# Patient Record
Sex: MALE | NOT HISPANIC OR LATINO | URBAN - METROPOLITAN AREA
[De-identification: names, ages, dates, MRNs, and addresses within clinical notes are randomized per-mention and may not be internally consistent; named-entity substitution may affect disease eponyms.]

---

## 2017-09-05 ENCOUNTER — IMPORTED ENCOUNTER (OUTPATIENT)
Dept: URBAN - METROPOLITAN AREA CLINIC 1 | Facility: CLINIC | Age: 67
End: 2017-09-05

## 2017-09-05 PROBLEM — H04.123: Noted: 2017-09-05

## 2017-09-05 PROBLEM — E11.9: Noted: 2017-09-05

## 2017-09-05 PROBLEM — Z79.84: Noted: 2017-09-05

## 2017-09-05 PROBLEM — H01.001: Noted: 2017-09-05

## 2017-09-05 PROBLEM — H01.004: Noted: 2017-09-05

## 2017-09-05 PROBLEM — H25.813: Noted: 2017-09-05

## 2017-09-05 PROCEDURE — 92014 COMPRE OPH EXAM EST PT 1/>: CPT

## 2017-09-05 NOTE — PATIENT DISCUSSION
1.  DM Type II without sign of diabetic retinopathy and no blot heme on dilated retinal examination today OU No Macular Edema:  Discussed the pathophysiology of diabetes and its effect on the eye and risk of blindness. Stressed the importance of strong glucose control. Advised of importance of at least yearly dilated examinations but to contact us immediately for any problems or concerns. 2. Type II diabetes controlled by oral medications. 3.  Cataract OU: Observe for now without intervention. The patient was advised to contact us if any change or worsening of vision4. Dry Eyes OU -- Recommended to patient to use Artificial Tears BID OU5. Blepharitis anterior type OU - Daily warm compresses and lid scrubs were recommended. 6. Patient defers the refraction at today's visit    7. Return for an appointment in 1 year for 30. with Dr. Jassi Small.

## 2017-10-06 NOTE — PATIENT DISCUSSION
(W56.444) Vitreous degeneration, bilateral - Assesment : Examination revealed PVD OU. - Plan : Monitor for changes. Advised patient to call our office with decreased vision or an increase in flashes and/or floaters.

## 2017-10-06 NOTE — PATIENT DISCUSSION
(E11.9) Type 2 diabetes mellitus without complications - Assesment : Examination reveals Type 2 Diabetes mellitus without ocular complications. - Plan : Continue following with PCP for routine care. Stressed importance of keeping A1c levels below 7.0 and monitoring blood sugar. Letter explaining today's findings faxed to patient's PCP. RV 6 Months for Follow Up and Macular OCT, or sooner if having problems or changes in vision.

## 2017-10-06 NOTE — PATIENT DISCUSSION
(W39.2193) Nonexudative age-related macular degeneration bilateral tessa - Assesment : Examination revealed AMD Dry OU. OCTM performed shows minimal change since last year. - Plan : Monitor for changes. Advised patient to call our office with decreased vision or increased distortion. Patient advised to check Amsler Grid regularly (once weekly or more) and use nutraceuticals such as AREDS 2 eye vitamins. Wear sunglasses when outdoors and eat green, leafy vegetables to maintain ocular health.

## 2017-10-06 NOTE — PATIENT DISCUSSION
(H25.013) Cortical age-related cataract, bilateral - Assesment : Examination revealed Cortical Senile Cataract. OD>OS. Hx of steroid injections. Moderate symptoms. - Plan : Monitor for changes. Advised patient to call our office with decreased vision or increased symptoms. Explanation of condition given to patient and that diabetic changes and steroid injections can promote cataract development. OK to proceed with cataract surgery when patient desires, but fine to wait at this time.

## 2018-09-05 ENCOUNTER — IMPORTED ENCOUNTER (OUTPATIENT)
Dept: URBAN - METROPOLITAN AREA CLINIC 1 | Facility: CLINIC | Age: 68
End: 2018-09-05

## 2018-09-05 PROBLEM — H25.813: Noted: 2018-09-05

## 2018-09-05 PROBLEM — H16.143: Noted: 2018-09-05

## 2018-09-05 PROBLEM — Z79.84: Noted: 2018-09-05

## 2018-09-05 PROBLEM — H01.002: Noted: 2018-09-05

## 2018-09-05 PROBLEM — H04.123: Noted: 2018-09-05

## 2018-09-05 PROBLEM — E11.9: Noted: 2018-09-05

## 2018-09-05 PROBLEM — H01.005: Noted: 2018-09-05

## 2018-09-05 PROCEDURE — 92014 COMPRE OPH EXAM EST PT 1/>: CPT

## 2018-09-05 NOTE — PATIENT DISCUSSION
1.  DM Type II without sign of diabetic retinopathy and no blot heme on dilated retinal examination today OU No Macular Edema: Controlled. Discussed the pathophysiology of diabetes and its effect on the eye and risk of blindness. Stressed the importance of strong glucose control. Advised of importance of at least yearly dilated examinations but to contact us immediately for any problems or concerns. 2. Type II diabetes controlled by oral medications. 3.  Cataract OU: Observe for now without intervention. The patient was advised to contact us if any change or worsening of vision4. ORIN w/ PEK OU- stable. The continuation of artificial tears were recommended. 5.  Blepharitis anterior type OU- Controlled. Continue daily warm compresses and lid scrubs recommended. 6. Patient defers the refraction at today's visit. Pt happy wearing otc readers only. 7.  Return for an appointment for 30 in 1 year with Dr. Kala Grace.

## 2018-10-04 NOTE — PATIENT DISCUSSION
(E11.9) Type 2 diabetes mellitus without complications - Assesment : Examination reveals Type 2 Diabetes mellitus without ocular complications. - Plan : Continue following with PCP for routine care. Stressed importance of keeping A1c levels below 7.0 and monitoring blood sugar. Letter explaining today's findings faxed to patient's PCP.

## 2018-10-04 NOTE — PATIENT DISCUSSION
(V01.035) Vitreous degeneration, bilateral - Assesment : Examination revealed PVD OU. - Plan : Monitor for changes. Advised patient to call our office with decreased vision or an increase in flashes and/or floaters.

## 2018-10-04 NOTE — PATIENT DISCUSSION
(W85.5469) Nonexudative age-related macular degeneration bilateral tessa - Assesment : Examination revealed AMD Dry OU. MAC OCT shows scattered drusen OU. - Plan : Monitor for changes. Advised patient to call our office with decreased vision or increased distortion. RV 6-8 months Exam/MAC OCT.

## 2019-01-30 NOTE — PATIENT DISCUSSION
(J82.0059) Nonexudative age-related macular degeneration bilateral tessa - Assesment : Examination revealed AMD Dry OU. DRY ARMD MILD UNCHANGED - Plan : Monitor for changes. Advised patient to call our office with decreased vision or increased distortion.

## 2019-01-30 NOTE — PATIENT DISCUSSION
(E11.9) Type 2 diabetes mellitus without complications - Assesment : Examination reveals Type 2 Diabetes mellitus without ocular complications. IF BLOOD SUGARS ARE OUT OF CONTROL TI COULD BLUR PATIENTS VISION BECAUSE IT CAUSES THE CORNEA TO SWELL - Plan : Continue following with PCP for routine care. Stressed importance of keeping A1c levels below 7.0 and monitoring blood sugar. Letter explaining today's findings faxed to patient's PCP.

## 2019-01-30 NOTE — PATIENT DISCUSSION
(H25.013) Cortical age-related cataract, bilateral - Assesment : Examination revealed Cortical Senile Cataract. OD>OS. Hx of steroid injections. PATIENT WILL NOTICE BETTER QUALITY AND CLARITY OF VISION IF THE CATARACTS WERE REMOVED. PATIENT DOES ALSO HAVE DRY ARMD OU. THE CATARACTS ARE SIGNIFICANT. ANATOMY OF CATARACTS DISCUSSED WITH PATIENT. THE DRY ARMD COULD CONTRIBUTE TO BLURRY VISION. PATIENT WILL NOT BE 20/20 BECAUSE OF THE UNDERLYING DRY ARMD. RECOMMEND PATIENT REMOVE THE CATARACTS FOR BETTER VISION.   A NEW RX WILL NOT IMPROVE THE VISION LONG TERM - Plan : PATIENT TO TALK TO SURGERY COUNSELOR TODAY

## 2019-02-18 NOTE — PATIENT DISCUSSION
(C00.7481) Nonexudative age-related macular degeneration bilateral tessa - Assesment : Examination revealed AMD Dry OU. DRY ARMD MILD UNCHANGED - Plan : Monitor for changes. Advised patient to call our office with decreased vision or increased distortion. Patient advised to check Amsler Grid regularly (once weekly or more) and use nutraceuticals such as AREDS 2 eye vitamins. Wear sunglasses when outdoors and eat green, leafy vegetables to maintain ocular health.

## 2019-02-18 NOTE — PATIENT DISCUSSION
(H25.13) Age-related nuclear cataract, bilateral - Assesment : Examination revealed cataract. BOTH CATARACTS AND ARMD OU ARE CONTRIBUTING TO BLURRED VISION. PATIENT WOULD LIKE TO FOCUS OU AT [place]. PATIENT WANTS TO IMPROVE THE QUALITY OF HER DISTANCE VISION WHILE DRIVING. DO NOT RECOMMEND MFIOLS SECONDARY TO ARMD.  ADVISED PATIENT SHE'S MILDLY NEARSIGHTED WHICH IS WHY PT IS ABLE TO READ WITHOUT GLASSES AT TIME. PATIENT DOESN'T MIND WEARING READING GLASSES. WISHES TO FOCUS OU AT DISTANCE AND WEAR GLASSES FOR READING. - Plan : Recommend package with advanced technology for the management and treatment of astigmatism and/or to aid in the management of presbyopia with blended vision, mono vision, or multifocal IOL. The astigmatism management may include Toric IOL placed intraoperatively, or LRI intraoperatively or as included postoperative treatment, or PRK treatment postoperatively. Risks, Benefits and Alternatives were discussed with patient at length for Cataract Surgery. Visual symptoms are consistent with Cataract findings on examination and current refraction no longer provides satisfactory vision. Patient understands and desires surgery. All questions answered. Risks, Benefits and Alternatives discussed at length for IOL placement. Patient will need to wear glasses for READING. EYE: OD  IOL TYPE: MONOFOCAL/ LRI  POST OPERATIVE TARGET: DISTANCE PLANO TO -0.50 DR RECOMMENDED PACKAGE:  TP / LRI  PT PREFERRED PACKAGE:  TP OS TO POSSIBLY FOLLOW Patient to see surgery counselor today.

## 2019-03-06 NOTE — PATIENT DISCUSSION
(Z96.1) Presence of intraocular lens - Assesment : Patient is Pseudophakic. ORA was done in OR on operated eye. NORMAL POST OP APPEARANCE AND ADVISED PATIENT TO CALL IF ANY CHANGES IN VISION OR PAIN. PATIENT CAN TAKE TYLENOL FOR THE HEADACHES - Plan : Discussed signs and symptoms of infection and retinal detachments. Do not rub operated eye.  Follow drop schedule If redness,pain,decreased vision, flashes or floaters occur then contact clinic.  1 WEEK

## 2019-03-12 NOTE — PATIENT DISCUSSION
(H25.13) Age-related nuclear cataract, bilateral - Assesment : Examination revealed cataract. BOTH CATARACTS AND ARMD OU ARE CONTRIBUTING TO BLURRED VISION. PATIENT WOULD LIKE TO FOCUS OU AT [email or web address]. PATIENT WANTS TO IMPROVE THE QUALITY OF HER DISTANCE VISION WHILE DRIVING. DO NOT RECOMMEND MFIOLS SECONDARY TO ARMD.  ADVISED PATIENT SHE'S MILDLY NEARSIGHTED WHICH IS WHY PT IS ABLE TO READ WITHOUT GLASSES AT TIME. PATIENT DOESN'T MIND WEARING READING GLASSES. WISHES TO FOCUS OU AT DISTANCE AND WEAR GLASSES FOR READING. OS IS SLIGHTLY NEARSIGHTED. SOME ASTIGMATISM PRESENT OS. - Plan : Recommend package with advanced technology for the management and treatment of astigmatism and/or to aid in the management of presbyopia with blended vision, mono vision, or multifocal IOL. The astigmatism management may include Toric IOL placed intraoperatively, or LRI intraoperatively or as included postoperative treatment, or PRK treatment postoperatively. Risks, Benefits and Alternatives were discussed with patient at length for Cataract Surgery. Visual symptoms are consistent with Cataract findings on examination and current refraction no longer provides satisfactory vision. Patient understands and desires surgery. All questions answered. Risks, Benefits and Alternatives discussed at length for IOL placement. Patient will need to wear glasses for READING. EYE: OS IOL TYPE: MONOFOCAL/ LRI  POST OPERATIVE TARGET: DISTANCE PLANO TO -0.50 DR RECOMMENDED PACKAGE:  TP / LRI  PT PREFERRED PACKAGE:  TP  Patient to see surgery counselor today.

## 2019-03-12 NOTE — PATIENT DISCUSSION
(C80.2524) Nonexudative age-related macular degeneration bilateral tessa - Assesment : Examination revealed AMD Dry OU - Plan : Monitor for changes. Advised patient to call our office with decreased vision or increased distortion. Patient advised to check Ams[employer] Grid regularly (once weekly or more) and use nutraceuticals such as AREDS 2 eye vitamins. Wear sunglasses when outdoors and eat green, leafy vegetables to maintain ocular health.

## 2019-03-12 NOTE — PATIENT DISCUSSION
(Z96.1) Presence of intraocular lens - Assesment : Patient is Pseudophakic. EYE IS RECOVERING WELL AND PATIENT WISHES TO PROCEED WITH SURGERY FOR THE LEFT EYE. RECOMMEND ARTIFICIAL TEARS WHEN EYE FEELS IRRITATED. - Plan : Signs and symptoms of infection and retinal detachment are outlined in your surgical packet. Do not rub operated eye. Follow drop schedule. If redness, pain, decreased vision, flashes or floaters occur then contact clinic.

## 2019-03-27 NOTE — PATIENT DISCUSSION
(Z96.1) Presence of intraocular lens - Assesment : Patient is Pseudophakic. ORA done in OR during surgery S/P KENALOG INJECTION-CONJUNCTIVAL AT THE TIME OF CATARACT SURGERY - Plan : Discussed signs and symptoms of infection and retinal detachments. Do not rub operated eye. Follow drop schedule If redness,pain,decreased vision, flashes or floaters occur then contact clinic.   USE INVELTYS QD OS START PROLENSA QD OU  1 WEEK

## 2019-04-02 NOTE — PATIENT DISCUSSION
(Z96.1) Presence of intraocular lens - Assesment : Patient is Pseudophakic. ORA done in OR during surgery S/P KENALOG INJECTION-CONJUNCTIVAL AT THE TIME OF CATARACT SURGERY  NORMAL POST OP APPEARANCE. LENS IS IN GOOD POSITION. - Plan : Discussed signs and symptoms of infection and retinal detachments. Do not rub operated eye. Follow drop schedule If redness,pain,decreased vision, flashes or floaters occur then contact clinic.  CPM: INVELTYS QD OS CONTINUE PROLENSA QD OU USE UNTIL ITS GONE  3 WEEKS REFRACT/ MAC OCT

## 2019-05-02 NOTE — PATIENT DISCUSSION
(Z96.1) Presence of intraocular lens - Assesment : Patient is Pseudophakic. NO INFLAMMATION SEEN OU TODAY  PAIN IN OS IS LIKELY REFERRED PAIN ? NECK. - Plan : Signs and symptoms of infection and retinal detachment are outlined in your surgical packet. Do not rub operated eye. Follow drop schedule. If redness, pain, decreased vision, flashes or floaters occur then contact clinic.  RESTART INVELTYS TID X 5 DAYS, THEN DECREASE TO BID X 5 DAYS, THEN DECREASE TO QD X 5 DAYS THEN STOP  RESTART PROLENSA OD QD X 3 WEEKS THEN STOP  GTT INSTRUCTIONS TO PT TODAY  1/2020 EXAM / MAC PHOTOS

## 2019-05-02 NOTE — PATIENT DISCUSSION
(E79.7541) Nonexudative age-related macular degeneration bilateral tessa - Assesment : Examination revealed AMD Dry OU- MILD - Plan : Monitor for changes. Advised patient to call our office with decreased vision or increased distortion. Patient advised to check Amsler Grid regularly (once weekly or more) and use nutraceuticals such as AREDS 2 eye vitamins. Wear sunglasses when outdoors and eat green, leafy vegetables to maintain ocular health.

## 2019-09-10 ENCOUNTER — IMPORTED ENCOUNTER (OUTPATIENT)
Dept: URBAN - METROPOLITAN AREA CLINIC 1 | Facility: CLINIC | Age: 69
End: 2019-09-10

## 2019-09-10 PROBLEM — H01.001: Noted: 2019-09-10

## 2019-09-10 PROBLEM — E11.9: Noted: 2019-09-10

## 2019-09-10 PROBLEM — H01.005: Noted: 2019-09-10

## 2019-09-10 PROBLEM — H25.813: Noted: 2019-09-10

## 2019-09-10 PROBLEM — H01.004: Noted: 2019-09-10

## 2019-09-10 PROBLEM — H01.002: Noted: 2019-09-10

## 2019-09-10 PROBLEM — Z79.84: Noted: 2019-09-10

## 2019-09-10 PROBLEM — H04.123: Noted: 2019-09-10

## 2019-09-10 PROBLEM — H16.143: Noted: 2019-09-10

## 2019-09-10 PROCEDURE — 92014 COMPRE OPH EXAM EST PT 1/>: CPT

## 2019-09-10 NOTE — PATIENT DISCUSSION
1.  DM Type II (Oral Meds) -- Without sign of diabetic retinopathy and no blot heme on dilated retinal examination today OU and no Macular Edema OU: Discussed the pathophysiology of diabetes and its effect on the eye and risk of blindness. Stressed the importance of strong glucose control. Advised of importance of at least yearly dilated examinations but to contact us immediately for any problems or concerns. 2. Cataracts OU -- Observe for now without intervention. The patient was advised to contact us if any change or worsening of vision. 3. ORIN w/ PEK OU -- Stable. Recommend continue the frequent use of OTC AT's BID-QID OU Routinely. 4. Blepharitis OU -- Recommend Hot Moist Compresses and Lid Scrubs / Baby Shampoo QHS OU PRN. Letter to Dr. Nai Burnett. Patient defers the refraction at today's visit. Return for an appointment in 1 YR for a Tiera / Israel Pena / MRx OU with Dr. Tiffanie Alvarado.

## 2020-02-13 NOTE — PATIENT DISCUSSION
Retinal tear and detachment warning symptoms reviewed and patient instructed to call immediately if increasing floaters, flashes, or decreasing peripheral vision. None

## 2020-03-11 NOTE — PATIENT DISCUSSION
Patient reported to the clinic with an irritated left eye from pouring alcohol in eye today. Patient shared that she flushed her eye shortly after getting alcohol in eye.

## 2020-03-12 NOTE — PATIENT DISCUSSION
Applied Amirah LOZANO. EDNA, BC 8.8Lot# [id number]. Advised not to rub eye. If it falls out call office to replace.

## 2020-09-10 ENCOUNTER — IMPORTED ENCOUNTER (OUTPATIENT)
Dept: URBAN - METROPOLITAN AREA CLINIC 1 | Facility: CLINIC | Age: 70
End: 2020-09-10

## 2020-09-10 PROBLEM — H16.143: Noted: 2020-09-10

## 2020-09-10 PROBLEM — H04.123: Noted: 2020-09-10

## 2020-09-10 PROBLEM — Z79.84: Noted: 2020-09-10

## 2020-09-10 PROBLEM — H40.013: Noted: 2020-09-10

## 2020-09-10 PROBLEM — E11.9: Noted: 2020-09-10

## 2020-09-10 PROBLEM — H25.813: Noted: 2020-09-10

## 2020-09-10 PROCEDURE — 92014 COMPRE OPH EXAM EST PT 1/>: CPT

## 2020-09-10 NOTE — PATIENT DISCUSSION
1.  DM Type II (oral meds) without sign of diabetic retinopathy and no blot heme on dilated retinal examination today OU No Macular Edema:  Discussed the pathophysiology of diabetes and its effect on the eye and risk of blindness. Stressed the importance of strong glucose control. Advised of importance of at least yearly dilated examinations but to contact us immediately for any problems or concerns. 2. Cataract OU- Observe for now without intervention. The patient was advised to contact us if any change or worsening of vision3. ORIN w/ PEK OU- Recommend AT's BID OU 4. Blepharitis OU - Recommend Hot Moist Compresses and Lid Scrubs / Baby Shampoo QHS OU PRN. 5. COAG suspect- (CD 0.55 OD 0.6 OS) IOP today 13 OU -FH. Patient is considered Low Risk. Condition was discussed with patient and patient understands. Will continue to monitor patient for any progression in condition. Patient was advised to call us with any problems questions or concerns. Patient deferred Manifest Rx today. Return for an appointment in 1 year 30/OCT  with Dr. Melissa Núñez.

## 2021-09-10 ENCOUNTER — IMPORTED ENCOUNTER (OUTPATIENT)
Dept: URBAN - METROPOLITAN AREA CLINIC 1 | Facility: CLINIC | Age: 71
End: 2021-09-10

## 2021-09-10 PROBLEM — H25.813: Noted: 2021-09-10

## 2021-09-10 PROBLEM — H40.013: Noted: 2021-09-10

## 2021-09-10 PROBLEM — E11.9: Noted: 2021-09-10

## 2021-09-10 PROBLEM — Z96.1: Noted: 2023-11-08

## 2021-09-10 PROBLEM — Z79.84: Noted: 2021-09-10

## 2021-09-10 PROCEDURE — 92015 DETERMINE REFRACTIVE STATE: CPT

## 2021-09-10 PROCEDURE — 92014 COMPRE OPH EXAM EST PT 1/>: CPT

## 2021-09-10 PROCEDURE — 92133 CPTRZD OPH DX IMG PST SGM ON: CPT

## 2021-09-10 NOTE — PATIENT DISCUSSION
1.  DM Type II (oral meds) without sign of diabetic retinopathy and no blot heme on dilated retinal examination today OU No Macular Edema:  Discussed the pathophysiology of diabetes and its effect on the eye and risk of blindness. Stressed the importance of strong glucose control. Advised of importance of at least yearly dilated examinations but to contact us immediately for any problems or concerns. 2. Glaucoma suspect -- (CD 0.55 OD 0.6 OS) -- OCT today shows . IOP today 14 OU Negative FHx. Patient is considered Low Risk. Condition was discussed with patient and patient understands. Will continue to monitor patient for any progression in condition. Patient was advised to call us with any problems questions or concerns. 3.  Cataract OU -- Observe for now without intervention. The patient was advised to contact us if any change or worsening of vision4. ORIN w/ PEK OU -- Recommend AT's BID OU 5. Blepharitis OU -- Recommend Hot Moist Compresses and Lid Scrubs / Baby Shampoo QHS OU PRN.

## 2021-09-10 NOTE — PATIENT DISCUSSION
1.  DM Type II (oral meds) without sign of diabetic retinopathy and no blot heme on dilated retinal examination today OU No Macular Edema:  Discussed the pathophysiology of diabetes and its effect on the eye and risk of blindness. Stressed the importance of strong glucose control. Advised of importance of at least yearly dilated examinations but to contact us immediately for any problems or concerns. 2. Glaucoma suspect -- (CD 0.55 OD 0.6 OS) -- OCT today shows no progression. IOP today 14 OU Negative FHx. Patient is considered Low Risk. Condition was discussed with patient and patient understands. Will continue to monitor patient for any progression in condition. Patient was advised to call us with any problems questions or concerns. 3.  Cataract OU -- Observe for now without intervention. The patient was advised to contact us if any change or worsening of vision4. ORIN w/ PEK OU -- Recommend AT's BID OU 5. Blepharitis OU -- Recommend Hot Moist Compresses and Lid Scrubs / Baby Shampoo QHS OU PRN. MRX for glasses given. Return for an appointment in 1 year 30/OCT/glare with Dr. Daisy Cifuentes.

## 2022-02-14 NOTE — PATIENT DISCUSSION
Recommended artificial tears to use as directed. May consider treating dry eye further if symptoms persists.

## 2022-04-02 ASSESSMENT — KERATOMETRY
OS_AXISANGLE_DEGREES: 171
OS_AXISANGLE2_DEGREES: 081
OD_AXISANGLE2_DEGREES: 093
OS_K1POWER_DIOPTERS: 43.00
OD_AXISANGLE_DEGREES: 003
OS_K2POWER_DIOPTERS: 44.50
OD_K2POWER_DIOPTERS: 44.25
OD_K1POWER_DIOPTERS: 42.50

## 2022-04-02 ASSESSMENT — TONOMETRY
OS_IOP_MMHG: 14
OD_IOP_MMHG: 13
OD_IOP_MMHG: 14
OS_IOP_MMHG: 14
OS_IOP_MMHG: 13
OD_IOP_MMHG: 14
OS_IOP_MMHG: 13
OS_IOP_MMHG: 12

## 2022-04-02 ASSESSMENT — VISUAL ACUITY
OD_CC: 20/25
OD_GLARE: 20/60
OD_GLARE: 20/50
OS_GLARE: 20/60
OD_GLARE: 20/50
OD_CC: 20/20
OD_CC: 20/20
OS_CC: 20/25
OD_CC: 20/20-2
OS_CC: 20/30
OS_CC: 20/30
OS_CC: 20/25+2
OD_GLARE: 20/50
OS_CC: 20/20-2
OS_GLARE: 20/60
OD_CC: 20/20
OS_GLARE: 20/50
OS_GLARE: 20/50

## 2022-09-13 ENCOUNTER — COMPREHENSIVE EXAM (OUTPATIENT)
Dept: URBAN - METROPOLITAN AREA CLINIC 1 | Facility: CLINIC | Age: 72
End: 2022-09-13

## 2022-09-13 DIAGNOSIS — H25.813: ICD-10-CM

## 2022-09-13 DIAGNOSIS — H16.143: ICD-10-CM

## 2022-09-13 DIAGNOSIS — H40.013: ICD-10-CM

## 2022-09-13 DIAGNOSIS — H43.813: ICD-10-CM

## 2022-09-13 DIAGNOSIS — H04.123: ICD-10-CM

## 2022-09-13 DIAGNOSIS — E11.9: ICD-10-CM

## 2022-09-13 PROCEDURE — 92133 CPTRZD OPH DX IMG PST SGM ON: CPT

## 2022-09-13 PROCEDURE — 92015 DETERMINE REFRACTIVE STATE: CPT

## 2022-09-13 PROCEDURE — 92014 COMPRE OPH EXAM EST PT 1/>: CPT

## 2022-09-13 ASSESSMENT — VISUAL ACUITY
OS_SC: 20/25
OS_BAT: 20/200
OS_SC: J1
OD_SC: J1
OD_BAT: 20/100
OD_SC: 20/25

## 2022-09-13 ASSESSMENT — TONOMETRY
OD_IOP_MMHG: 12
OS_IOP_MMHG: 12

## 2022-09-13 NOTE — PATIENT DISCUSSION
(CD 0.55 OD 0.6 OS) -- OCT today shows no progression and WNL. IOP today 14 OU Negative FHx. Patient is considered Low Risk. Condition was discussed with patient and patient understands. Will continue to monitor patient for any progression in condition. Patient was advised to call us with any problems questions or concerns.

## 2022-09-13 NOTE — PATIENT DISCUSSION
(oral meds) without sign of diabetic retinopathy and no blot heme on dilated retinal examination today OU No Macular Edema: Discussed the pathophysiology of diabetes and its effect on the eye and risk of blindness. Stressed the importance of strong glucose control. Advised of importance of at least yearly dilated examinations but to contact us immediately for any problems or concerns.

## 2022-09-13 NOTE — PATIENT DISCUSSION
The patient defers cataract surgery at this time despite visually significant secondary to glare. Patient may call and schedule if need be.

## 2023-09-27 ENCOUNTER — COMPREHENSIVE EXAM (OUTPATIENT)
Dept: URBAN - METROPOLITAN AREA CLINIC 1 | Facility: CLINIC | Age: 73
End: 2023-09-27

## 2023-09-27 DIAGNOSIS — H25.813: ICD-10-CM

## 2023-09-27 DIAGNOSIS — H04.123: ICD-10-CM

## 2023-09-27 DIAGNOSIS — H43.813: ICD-10-CM

## 2023-09-27 DIAGNOSIS — E11.9: ICD-10-CM

## 2023-09-27 DIAGNOSIS — H16.143: ICD-10-CM

## 2023-09-27 DIAGNOSIS — H40.013: ICD-10-CM

## 2023-09-27 PROCEDURE — 92083 EXTENDED VISUAL FIELD XM: CPT

## 2023-09-27 PROCEDURE — 92014 COMPRE OPH EXAM EST PT 1/>: CPT

## 2023-09-27 PROCEDURE — 92133 CPTRZD OPH DX IMG PST SGM ON: CPT

## 2023-09-27 PROCEDURE — 92015 DETERMINE REFRACTIVE STATE: CPT

## 2023-09-27 ASSESSMENT — VISUAL ACUITY
OS_SC: 20/30+1
OD_SC: 20/25-2
OD_BAT: 20/150
OS_BAT: 20/200
OD_SC: J2
OS_SC: J2

## 2023-09-27 ASSESSMENT — TONOMETRY
OD_IOP_MMHG: 16
OS_IOP_MMHG: 14

## 2023-10-24 ENCOUNTER — COMPREHENSIVE EXAM (OUTPATIENT)
Dept: URBAN - METROPOLITAN AREA CLINIC 1 | Facility: CLINIC | Age: 73
End: 2023-10-24

## 2023-10-24 VITALS
DIASTOLIC BLOOD PRESSURE: 50 MMHG | WEIGHT: 300 LBS | SYSTOLIC BLOOD PRESSURE: 94 MMHG | HEART RATE: 76 BPM | BODY MASS INDEX: 35.42 KG/M2 | HEIGHT: 77 IN

## 2023-10-24 DIAGNOSIS — H25.813: ICD-10-CM

## 2023-10-24 PROCEDURE — 92136 OPHTHALMIC BIOMETRY: CPT

## 2023-10-24 PROCEDURE — 92012 INTRM OPH EXAM EST PATIENT: CPT

## 2023-10-24 PROCEDURE — 92025 CPTRIZED CORNEAL TOPOGRAPHY: CPT | Mod: NC

## 2023-10-24 ASSESSMENT — TONOMETRY
OS_IOP_MMHG: 14
OD_IOP_MMHG: 15

## 2023-10-24 ASSESSMENT — VISUAL ACUITY
OD_BAT: 20/150
OS_SC: 20/30+1
OD_SC: 20/25-2
OS_BAT: 20/200

## 2023-11-08 ENCOUNTER — SURGERY/PROCEDURE (OUTPATIENT)
Dept: URBAN - METROPOLITAN AREA SURGERY 1 | Facility: SURGERY | Age: 73
End: 2023-11-08

## 2023-11-08 DIAGNOSIS — H25.812: ICD-10-CM

## 2023-11-08 PROBLEM — Z96.1: Noted: 2023-11-08

## 2023-11-08 PROBLEM — E11.9: Noted: 2021-09-10

## 2023-11-08 PROBLEM — H40.013: Noted: 2021-09-10

## 2023-11-08 PROCEDURE — 66984 XCAPSL CTRC RMVL W/O ECP: CPT

## 2023-11-08 PROCEDURE — 68841 INSJ RX ELUT IMPLT LAC CANAL: CPT

## 2023-11-09 ENCOUNTER — POST-OP (OUTPATIENT)
Dept: URBAN - METROPOLITAN AREA CLINIC 2 | Facility: CLINIC | Age: 73
End: 2023-11-09

## 2023-11-09 DIAGNOSIS — Z96.1: ICD-10-CM

## 2023-11-09 PROCEDURE — 99024 POSTOP FOLLOW-UP VISIT: CPT

## 2023-11-09 ASSESSMENT — VISUAL ACUITY: OS_SC: 20/20-1

## 2023-11-09 ASSESSMENT — TONOMETRY: OS_IOP_MMHG: 14

## 2023-11-14 ENCOUNTER — POST OP/EVAL OF SECOND EYE (OUTPATIENT)
Dept: URBAN - METROPOLITAN AREA CLINIC 1 | Facility: CLINIC | Age: 73
End: 2023-11-14

## 2023-11-14 VITALS
WEIGHT: 300 LBS | DIASTOLIC BLOOD PRESSURE: 50 MMHG | SYSTOLIC BLOOD PRESSURE: 94 MMHG | HEART RATE: 76 BPM | HEIGHT: 77 IN | BODY MASS INDEX: 35.42 KG/M2

## 2023-11-14 DIAGNOSIS — H25.811: ICD-10-CM

## 2023-11-14 DIAGNOSIS — Z96.1: ICD-10-CM

## 2023-11-14 PROCEDURE — 92025 CPTRIZED CORNEAL TOPOGRAPHY: CPT

## 2023-11-14 PROCEDURE — 99024 POSTOP FOLLOW-UP VISIT: CPT

## 2023-11-14 PROCEDURE — 92136 OPHTHALMIC BIOMETRY: CPT

## 2023-11-14 ASSESSMENT — TONOMETRY: OS_IOP_MMHG: 13

## 2023-11-14 ASSESSMENT — VISUAL ACUITY: OS_SC: 20/25

## 2023-11-29 ENCOUNTER — SURGERY/PROCEDURE (OUTPATIENT)
Dept: URBAN - METROPOLITAN AREA SURGERY 1 | Facility: SURGERY | Age: 73
End: 2023-11-29

## 2023-11-29 DIAGNOSIS — H25.811: ICD-10-CM

## 2023-11-29 PROCEDURE — 68841 INSJ RX ELUT IMPLT LAC CANAL: CPT

## 2023-11-29 PROCEDURE — 66984 XCAPSL CTRC RMVL W/O ECP: CPT

## 2023-11-30 ENCOUNTER — POST-OP (OUTPATIENT)
Dept: URBAN - METROPOLITAN AREA CLINIC 2 | Facility: CLINIC | Age: 73
End: 2023-11-30

## 2023-11-30 DIAGNOSIS — Z96.1: ICD-10-CM

## 2023-11-30 PROCEDURE — 99024 POSTOP FOLLOW-UP VISIT: CPT

## 2023-11-30 ASSESSMENT — TONOMETRY
OD_IOP_MMHG: 21
OS_IOP_MMHG: 14

## 2023-11-30 ASSESSMENT — VISUAL ACUITY
OS_SC: 20/20
OD_SC: 20/30
OD_PH: 20/20

## 2023-12-29 ENCOUNTER — POST-OP (OUTPATIENT)
Dept: URBAN - METROPOLITAN AREA CLINIC 2 | Facility: CLINIC | Age: 73
End: 2023-12-29

## 2023-12-29 DIAGNOSIS — Z96.1: ICD-10-CM

## 2023-12-29 PROCEDURE — 99024 POSTOP FOLLOW-UP VISIT: CPT

## 2023-12-29 ASSESSMENT — TONOMETRY
OD_IOP_MMHG: 14
OS_IOP_MMHG: 12

## 2023-12-29 ASSESSMENT — VISUAL ACUITY
OD_SC: 20/25
OS_SC: 20/20

## 2024-10-29 ENCOUNTER — COMPREHENSIVE EXAM (OUTPATIENT)
Dept: URBAN - METROPOLITAN AREA CLINIC 2 | Facility: CLINIC | Age: 74
End: 2024-10-29

## 2024-10-29 DIAGNOSIS — H40.013: ICD-10-CM

## 2024-10-29 DIAGNOSIS — H04.123: ICD-10-CM

## 2024-10-29 DIAGNOSIS — E11.9: ICD-10-CM

## 2024-10-29 DIAGNOSIS — Z96.1: ICD-10-CM

## 2024-10-29 DIAGNOSIS — H43.813: ICD-10-CM

## 2024-10-29 DIAGNOSIS — D31.32: ICD-10-CM

## 2024-10-29 DIAGNOSIS — H26.493: ICD-10-CM

## 2024-10-29 DIAGNOSIS — H35.371: ICD-10-CM

## 2024-10-29 PROCEDURE — 92133 CPTRZD OPH DX IMG PST SGM ON: CPT

## 2024-10-29 PROCEDURE — 92014 COMPRE OPH EXAM EST PT 1/>: CPT
